# Patient Record
Sex: MALE | Race: WHITE
[De-identification: names, ages, dates, MRNs, and addresses within clinical notes are randomized per-mention and may not be internally consistent; named-entity substitution may affect disease eponyms.]

---

## 2019-12-30 ENCOUNTER — HOSPITAL ENCOUNTER (EMERGENCY)
Dept: HOSPITAL 46 - ED | Age: 69
Discharge: HOME | End: 2019-12-30
Payer: OTHER GOVERNMENT

## 2019-12-30 VITALS — WEIGHT: 144 LBS | HEIGHT: 69 IN | BODY MASS INDEX: 21.33 KG/M2

## 2019-12-30 DIAGNOSIS — R63.4: ICD-10-CM

## 2019-12-30 DIAGNOSIS — Z79.899: ICD-10-CM

## 2019-12-30 DIAGNOSIS — Z88.6: ICD-10-CM

## 2019-12-30 DIAGNOSIS — K59.00: Primary | ICD-10-CM

## 2019-12-30 NOTE — XMS
PreManage Notification: LINDA DUNCAN MRN:[id number]
 
Security Information
 
Security Events
No recent Security Events currently on file
 
 
 
CRITERIA MET
------------
- 6 ED Visits in 6 Months
- [facility] - 2 Visits in 30 Days
 
 
CARE PROVIDERS
There are no care providers on record at this time.
 
Darlyn has no Care Guidelines for this patient.
 
CHARLEE VISIT COUNT (12 MO.)
-------------------------------------------------------------------------------------
[facility]
 
[facility]
-------------------------------------------------------------------------------------
TOTAL 6
-------------------------------------------------------------------------------------
NOTE: Visits indicate total known visits.
 
ED/C VISIT TRACKING (12 MO.)
-------------------------------------------------------------------------------------
[facility]
 
[facility]
 
COMPLAINT:
- UPPER ABD PAIN
-------------------------------------------------------------------------------------
12/19/2019 16:15
[facility] -         [place]
 
TYPE: Emergency
 
COMPLAINT:
- ABD PAIN/RECTUM PAIN
 
DIAGNOSES:
- Anal abscess
- Allergy status to analgesic agent status
- Anal abscess
- Allergy status to analgesic agent status
-------------------------------------------------------------------------------------
12/10/2019 17:35
[facility] -         [place]
 
 
TYPE: Emergency
 
COMPLAINT:
- rectal pain
 
DIAGNOSES:
- Right lower quadrant pain
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
- Acquired absence of other organs
- Acquired absence of other organs
- Left lower quadrant pain
- Other specified diseases of anus and rectum
- Allergy status to analgesic agent status
- Allergy status to analgesic agent status
- Right lower quadrant pain
- Calculus of kidney
- Other specified diseases of anus and rectum
- Left lower quadrant pain
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
- Calculus of kidney
-------------------------------------------------------------------------------------
12/05/2019 14:50
[facility] -         [place]
 
TYPE: Emergency
 
COMPLAINT:
- ABD PAIN
 
DIAGNOSES:
- Allergy status to analgesic agent status
- Acquired absence of other organs
- Right lower quadrant pain
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
-------------------------------------------------------------------------------------
11/01/2019 15:02
[facility] -         [place]
 
TYPE: Emergency
 
COMPLAINT:
- CHILLS, NEEDS TO SEE THE DOCTOR AND LAB
 
DIAGNOSES:
- Pain in throat
- Cough
- Fever, unspecified
- Oth symptoms and signs involving the circ and resp systems
- Benign essential microscopic hematuria
- Dehydration
-------------------------------------------------------------------------------------
10/30/2019 17:15
[facility] -         [place]
 
TYPE: Emergency
 
COMPLAINT:
 
- constriction in throat, cold sx
 
DIAGNOSES:
- Cough
- Laryngeal spasm
-------------------------------------------------------------------------------------
 
 
INPATIENT VISIT TRACKING (12 MO.)
No inpatient visits to display in this time frame
 
[id number]